# Patient Record
(demographics unavailable — no encounter records)

---

## 2019-01-06 NOTE — RAD
EXAM: CHEST 1 VIEW.

 

HISTORY: Chest pain.

 

COMPARISON: None.

 

FINDINGS: A frontal view of the chest is obtained.

 

There are no confluent infiltrates. There is no pneumothorax or pleural 

effusion. The heart is not enlarged.

 

IMPRESSION: 

1. No confluent infiltrates.

 

Electronically signed by: AGUSTIN Stanford MD (1/6/2019 9:36 PM) [facility]

## 2019-01-06 NOTE — PHYS DOC
Adult General


Chief Complaint


Chief Complaint:  CHEST PAIN-NON CARDIAC NATURE





HPI


HPI





Patient is a 21  year old male with no significant medical history who presents 

today complaining his chest is not feeling "right" patient describes the 

feeling as tightness, and cramping. Patient states this began yesterday after 

he drank an energy drink. He states  EMS did an EKG on him yesterday and told 

him he is okay. He states is concerned and would like to be checked out. 

Patient denies anyone in the family dying of cardiac events at the age of 50  

or less.





Review of Systems


Review of Systems





Constitutional: Denies fever or chills []


Eyes: Denies change in visual acuity, redness, or eye pain []


HENT: Denies nasal congestion or sore throat []


Respiratory: Denies cough or shortness of breath []


Cardiovascular: chest not feeling right


GI: Denies abdominal pain, nausea, vomiting, bloody stools or diarrhea []


: Denies dysuria or hematuria []


Musculoskeletal: Denies back pain or joint pain []


Integument: Denies rash or skin lesions []


Neurologic: Denies headache, focal weakness or sensory changes []





All other systems were reviewed and found to be within normal limits, except as 

documented in this note.





Current Medications


Current Medications





Current Medications








 Medications


  (Trade)  Dose


 Ordered  Sig/Janes  Start Time


 Stop Time Status Last Admin


Dose Admin


 


 Alprazolam


  (Xanax)  0.5 mg  1X  ONCE  1/6/19 22:00


 1/6/19 22:01 UNV  





 


 Aspirin


  (Travon Aspirin)  325 mg  1X  ONCE  1/6/19 21:00


 1/6/19 21:01 DC 1/6/19 21:03


325 MG


 


 Sodium Chloride  1,000 ml @ 


 1,000 mls/hr  1X  ONCE  1/6/19 21:00


 1/6/19 21:59   














Allergies


Allergies





Allergies








Coded Allergies Type Severity Reaction Last Updated Verified


 


  No Known Drug Allergies    1/6/19 No











Physical Exam


Physical Exam





Constitutional: Well developed, well nourished, no acute distress, non-toxic 

appearance. []


HENT: Normocephalic, atraumatic, bilateral external ears normal, oropharynx 

moist, no oral exudates, nose normal. []


Eyes: PERRLA, EOMI, conjunctiva normal, no discharge. [] 


Neck: Normal range of motion, no tenderness, supple, no stridor. [] 


Cardiovascular:Heart rate regular rhythm, no murmur []


Lungs & Thorax:  Bilateral breath sounds clear to auscultation []


Abdomen: Bowel sounds normal, soft, no tenderness, no masses, no pulsatile 

masses. [] 


Skin: Warm, dry, no erythema, no rash. [] 


Back: No tenderness, no CVA tenderness. [] 


Extremities: No tenderness, no cyanosis, no clubbing, ROM intact, no edema. [] 


Neurologic: Alert and oriented X 3, normal motor function, normal sensory 

function, no focal deficits noted. []


Psychologic: Affect normal, judgement normal, mood normal. []





Current Patient Data


Vital Signs





 Vital Signs








  Date Time  Temp Pulse Resp B/P (MAP) Pulse Ox O2 Delivery O2 Flow Rate FiO2


 


1/6/19 21:20  71 20 145/65 (91) 100 Room Air  


 


1/6/19 20:40 98.1       





 98.1       








Lab Values





 Laboratory Tests








Test


 1/6/19


20:38 1/6/19


21:05


 


Urine Collection Type Unknown   


 


Urine Color Yellow   


 


Urine Clarity Cloudy   


 


Urine pH 8.0   


 


Urine Specific Gravity 1.015   


 


Urine Protein


 Negative mg/dL


(NEG-TRACE) 





 


Urine Glucose (UA)


 Negative mg/dL


(NEG) 





 


Urine Ketones (Stick)


 Negative mg/dL


(NEG) 





 


Urine Blood


 Negative (NEG)


 





 


Urine Nitrite


 Negative (NEG)


 





 


Urine Bilirubin


 Negative (NEG)


 





 


Urine Urobilinogen Dipstick


 0.2 mg/dL (0.2


mg/dL) 





 


Urine Leukocyte Esterase


 Negative (NEG)


 





 


Urine RBC 0 /HPF (0-2)   


 


Urine WBC 0 /HPF (0-4)   


 


Urine Amorphous Sediment Present /HPF   


 


Urine Bacteria


 0 /HPF (0-FEW)


 





 


Urine Opiates Screen Neg (NEG)   


 


Urine Methadone Screen Neg (NEG)   


 


Urine Barbiturates Neg (NEG)   


 


Urine Phencyclidine Screen Neg (NEG)   


 


Urine


Amphetamine/Methamphetamine Neg (NEG)  


 





 


Urine Benzodiazepines Screen Neg (NEG)   


 


Urine Cocaine Screen Neg (NEG)   


 


Urine Cannabinoids Screen Neg (NEG)   


 


Urine Ethyl Alcohol Neg (NEG)   


 


White Blood Count


 


 8.4 x10^3/uL


(4.0-11.0)


 


Red Blood Count


 


 5.08 x10^6/uL


(4.30-5.70)


 


Hemoglobin


 


 15.5 g/dL


(13.0-17.5)


 


Hematocrit


 


 43.8 %


(39.0-53.0)


 


Mean Corpuscular Volume


 


 86 fL ()





 


Mean Corpuscular Hemoglobin  31 pg (25-35)  


 


Mean Corpuscular Hemoglobin


Concent 


 36 g/dL


(31-37)


 


Red Cell Distribution Width


 


 12.6 %


(11.5-14.5)


 


Platelet Count


 


 245 x10^3/uL


(140-400)


 


Neutrophils (%) (Auto)  54 % (31-73)  


 


Lymphocytes (%) (Auto)  36 % (24-48)  


 


Monocytes (%) (Auto)  7 % (0-9)  


 


Eosinophils (%) (Auto)  3 % (0-3)  


 


Basophils (%) (Auto)  1 % (0-3)  


 


Neutrophils # (Auto)


 


 4.5 x10^3uL


(1.8-7.7)


 


Lymphocytes # (Auto)


 


 3.0 x10^3/uL


(1.0-4.8)


 


Monocytes # (Auto)


 


 0.5 x10^3/uL


(0.0-1.1)


 


Eosinophils # (Auto)


 


 0.2 x10^3/uL


(0.0-0.7)


 


Basophils # (Auto)


 


 0.1 x10^3/uL


(0.0-0.2)


 


D-Dimer (Milagros)


 


 < 0.27


ug/mlFEU


 


Sodium Level


 


 138 mmol/L


(136-145)


 


Potassium Level


 


 3.4 mmol/L


(3.5-5.1)  L


 


Chloride Level


 


 101 mmol/L


()


 


Carbon Dioxide Level


 


 30 mmol/L


(21-32)


 


Anion Gap  7 (6-14)  


 


Blood Urea Nitrogen


 


 14 mg/dL


(8-26)


 


Creatinine


 


 1.1 mg/dL


(0.7-1.3)


 


Estimated GFR


(Cockcroft-Gault) 


 84.5  





 


BUN/Creatinine Ratio  13 (6-20)  


 


Glucose Level


 


 110 mg/dL


(70-99)  H


 


Calcium Level


 


 9.4 mg/dL


(8.5-10.1)


 


Magnesium Level


 


 2.0 mg/dL


(1.8-2.4)


 


Total Bilirubin


 


 2.1 mg/dL


(0.2-1.0)  H


 


Aspartate Amino Transferase


(AST) 


 20 U/L (15-37)





 


Alanine Aminotransferase (ALT)


 


 21 U/L (16-63)





 


Alkaline Phosphatase


 


 64 U/L


()


 


Troponin I Quantitative


 


 < 0.017 ng/mL


(0.000-0.055)


 


NT-Pro-B-Type Natriuretic


Peptide 


 7 pg/mL


(0-124)


 


Total Protein


 


 7.6 g/dL


(6.4-8.2)


 


Albumin


 


 4.1 g/dL


(3.4-5.0)


 


Albumin/Globulin Ratio  1.2 (1.0-1.7)  


 


Lipase


 


 220 U/L


()


 


Thyroid Stimulating Hormone


(TSH) 


 0.817 uIU/mL


(0.358-3.74)





 Laboratory Tests


1/6/19 21:05








 Laboratory Tests


1/6/19 21:05











EKG


EKG


20:44 Interpreted by Dr. Green sinus rhythm HR 65 no STEMI[]





Radiology/Procedures


Radiology/Procedures


[]





Course & Med Decision Making


Course & Med Decision Making


Pertinent Labs and Imaging studies reviewed. (See chart for details)





This is a 21-year-old male patient presented to the ED today complaining of his 

heart not feeling right since yesterday when he had an energy drink. Negative 

EKG on arrival to the ED. Patient has no risk factors for CAD.


Patient's lab work including CBC, CMP, troponin, urine analysis were negative 

for any acute findings. Drug screen is negative. D-dimer is normal. Vitals are 

normal.





Mother is present. She believes patient could have anxiety because his had 

similar symptoms before. Spoke to patient about anxiety and his current 

symptoms. Recommended they follow up with the PCP as well as the cardiologist 

provided we talked about relaxation measures. Patient apparently vapes. 

Encouraged to consider not vaping. He requested prescription for albuterol 

inhaler. Rx provided.





Dragon Disclaimer


Dragon Disclaimer


This electronic medical record was generated, in whole or in part, using a 

voice recognition dictation system.





Departure


Departure


Impression:  


 Primary Impression:  


 Anxiety


 Additional Impression:  


 Chest pain


Disposition:  01 HOME, SELF-CARE


Condition:  STABLE


Referrals:  


GUADALUPE SAM MD


follow up in one week


Patient Instructions:  Anxiety and Panic Attacks, Chest Pain (Nonspecific)





Additional Instructions:  


You were evaluated in the emergency room for chest discomfort. Your workup was 

negative for any acute findings. Please consider not Vaping. Consider following 

up with your primary care doctor as well as cardiologist provided. You can also 

follow-up with [facility] for counseling/ as well as to be seen 

for anxiety. Come back to the ED at any point symptoms worsen.


Scripts


Albuterol Sulfate (VENTOLIN HFA INHALER) 18 Gm Hfa.aer.ad


2 PUFF INH Q4HRS for FOR ASTHMA, #1 INHALER 0 Refills


   Prov: LEONELA KRUGER         1/6/19





Problem Qualifiers








 Additional Impression:  


 Chest pain


 Chest pain type:  unspecified  Qualified Codes:  R07.9 - Chest pain, 

unspecified








LEONELA KRUGER APRN Jan 6, 2019 20:56

## 2019-01-07 NOTE — EKG
[facility] acute hospital

              [address] [place] [address]

Test Date:    2019               Test Time:    20:44:44

Pat Name:     REBEKAH CARTWRIGHT             Department:   

Patient ID:   [id number]           Room:          

Gender:       M                        Technician:   

:          [id number]               Requested By: LEONELA KRUGER

Order Number: [id number]           Reading MD:     

                                 Measurements

Intervals                              Axis          

Rate:         65                       P:            50

DC:           166                      QRS:          56

QRSD:         92                       T:            46

QT:           354                                    

QTc:          372                                    

                           Interpretive Statements

SINUS RHYTHM

NON SPECIFIC ST-T ABNORMALITY (ELEVATION)

OTHERWISE NORMAL ECG

No previous ECG available for comparison